# Patient Record
Sex: MALE | Race: BLACK OR AFRICAN AMERICAN | NOT HISPANIC OR LATINO | URBAN - METROPOLITAN AREA
[De-identification: names, ages, dates, MRNs, and addresses within clinical notes are randomized per-mention and may not be internally consistent; named-entity substitution may affect disease eponyms.]

---

## 2017-06-01 ENCOUNTER — EMERGENCY (EMERGENCY)
Facility: HOSPITAL | Age: 32
LOS: 1 days | Discharge: HOME | End: 2017-06-01
Attending: EMERGENCY MEDICINE
Payer: COMMERCIAL

## 2017-06-01 VITALS
OXYGEN SATURATION: 100 % | HEIGHT: 70 IN | WEIGHT: 179.9 LBS | HEART RATE: 88 BPM | TEMPERATURE: 98 F | SYSTOLIC BLOOD PRESSURE: 131 MMHG | DIASTOLIC BLOOD PRESSURE: 86 MMHG | RESPIRATION RATE: 16 BRPM

## 2017-06-01 PROCEDURE — 99283 EMERGENCY DEPT VISIT LOW MDM: CPT

## 2017-06-01 RX ORDER — DIAZEPAM 5 MG
5 TABLET ORAL ONCE
Qty: 0 | Refills: 0 | Status: DISCONTINUED | OUTPATIENT
Start: 2017-06-01 | End: 2017-06-01

## 2017-06-01 RX ORDER — IBUPROFEN 200 MG
600 TABLET ORAL ONCE
Qty: 0 | Refills: 0 | Status: COMPLETED | OUTPATIENT
Start: 2017-06-01 | End: 2017-06-01

## 2017-06-01 RX ORDER — IBUPROFEN 200 MG
1 TABLET ORAL
Qty: 12 | Refills: 0 | OUTPATIENT
Start: 2017-06-01 | End: 2017-06-04

## 2017-06-01 RX ORDER — DIAZEPAM 5 MG
1 TABLET ORAL
Qty: 9 | Refills: 0 | OUTPATIENT
Start: 2017-06-01 | End: 2017-06-04

## 2017-06-01 RX ADMIN — Medication 600 MILLIGRAM(S): at 10:18

## 2017-06-01 RX ADMIN — Medication 600 MILLIGRAM(S): at 10:48

## 2017-06-01 RX ADMIN — Medication 5 MILLIGRAM(S): at 10:18

## 2017-06-01 NOTE — ED PROVIDER NOTE - MEDICAL DECISION MAKING DETAILS
low back pain, likely musculoskeletal, low suspicion for fracture; PLAN--pain/spasm control, re-eval

## 2017-06-01 NOTE — ED ADULT NURSE NOTE - CAS EDN DISCHARGE ASSESSMENT
Symptoms improved/Alert and oriented to person, place and time/No adverse reaction to first time med in ED/Awake

## 2017-06-01 NOTE — ED ADULT NURSE NOTE - OBJECTIVE STATEMENT
Pt c/o lower back pain since Tuesday, after lifting something at work  States this morning woke up with a lot of pain

## 2017-06-01 NOTE — ED PROVIDER NOTE - CARE PLAN
Principal Discharge DX:	Acute bilateral low back pain with bilateral sciatica Principal Discharge DX:	Acute bilateral low back pain with bilateral sciatica  Instructions for follow-up, activity and diet:	1. TAKE ALL MEDICATIONS AS DIRECTED.    2. FOR PAIN OR FEVER YOU CAN TAKE IBUPROFEN (MOTRIN, ADVIL) OR ACETAMINOPHEN (TYLENOL) AS NEEDED, AS DIRECTED ON PACKAGING.  3. FOLLOW UP WITH YOUR PRIMARY DOCTOR WITHIN 5 DAYS AS DIRECTED.  4. IF YOU HAD LABS OR IMAGING DONE, YOU WERE GIVEN COPIES OF ALL LABS AND/OR IMAGING RESULTS FROM YOUR ER VISIT--PLEASE TAKE THEM WITH YOU TO YOUR FOLLOW UP APPOINTMENTS.  5. IF NEEDED, CALL PATIENT ACCESS SERVICES AT [phone] TO FIND A PRIMARY CARE PHYSICIAN.  OR CALL [phone] TO MAKE AN APPOINTMENT WITH THE CLINIC.  6. RETURN TO THE ER FOR ANY WORSENING SYMPTOMS OR CONCERNS.

## 2017-06-01 NOTE — ED PROVIDER NOTE - PHYSICAL EXAMINATION
low back: bilateral paraspinal spasm, no midline TTP  strength 5/5 in all 4 extremities  gait intact

## 2017-06-01 NOTE — ED PROVIDER NOTE - OBJECTIVE STATEMENT
30 yo male with no sig PMH in ED with low back pain s/p lifting sheet metal at work.  Pt notes that after lifting sheet metal he began to feel pain to middle of low back, radiating into both legs to level of knees.  No bowel/bladder incontinence or retention, no weakness.  No CP/SOB, N/V/D or abdominal pain.  Took no pain medication today.

## 2017-06-01 NOTE — ED PROVIDER NOTE - PLAN OF CARE
1. TAKE ALL MEDICATIONS AS DIRECTED.    2. FOR PAIN OR FEVER YOU CAN TAKE IBUPROFEN (MOTRIN, ADVIL) OR ACETAMINOPHEN (TYLENOL) AS NEEDED, AS DIRECTED ON PACKAGING.  3. FOLLOW UP WITH YOUR PRIMARY DOCTOR WITHIN 5 DAYS AS DIRECTED.  4. IF YOU HAD LABS OR IMAGING DONE, YOU WERE GIVEN COPIES OF ALL LABS AND/OR IMAGING RESULTS FROM YOUR ER VISIT--PLEASE TAKE THEM WITH YOU TO YOUR FOLLOW UP APPOINTMENTS.  5. IF NEEDED, CALL PATIENT ACCESS SERVICES AT [phone] TO FIND A PRIMARY CARE PHYSICIAN.  OR CALL [phone] TO MAKE AN APPOINTMENT WITH THE CLINIC.  6. RETURN TO THE ER FOR ANY WORSENING SYMPTOMS OR CONCERNS.

## 2017-06-05 DIAGNOSIS — M54.41 LUMBAGO WITH SCIATICA, RIGHT SIDE: ICD-10-CM

## 2017-06-05 DIAGNOSIS — M54.42 LUMBAGO WITH SCIATICA, LEFT SIDE: ICD-10-CM

## 2020-03-19 ENCOUNTER — EMERGENCY (EMERGENCY)
Facility: HOSPITAL | Age: 35
LOS: 1 days | Discharge: HOME | End: 2020-03-19
Attending: EMERGENCY MEDICINE
Payer: COMMERCIAL

## 2020-03-19 VITALS
TEMPERATURE: 99 F | SYSTOLIC BLOOD PRESSURE: 150 MMHG | HEART RATE: 80 BPM | HEIGHT: 73 IN | WEIGHT: 169.98 LBS | DIASTOLIC BLOOD PRESSURE: 99 MMHG | RESPIRATION RATE: 18 BRPM | OXYGEN SATURATION: 100 %

## 2020-03-19 PROCEDURE — U0001: CPT

## 2020-03-19 PROCEDURE — 99283 EMERGENCY DEPT VISIT LOW MDM: CPT

## 2020-03-19 PROCEDURE — 99282 EMERGENCY DEPT VISIT SF MDM: CPT

## 2020-03-19 NOTE — ED PROVIDER NOTE - NSFOLLOWUPINSTRUCTIONS_ED_ALL_ED_FT
You may have Coronavirus    COVID-19 testing are currently being prioritized at [facility] for admitted patients.     Patients that are stable for discharged from the ED will have COVID-19 testing performed within 7-10 days at which time most symptoms should improve.  For that reason, although, there is suspcion that you may have Coronavirus, you were not tested today. Please follow instruction on provided COVID-19 discharge educational forms and self quarantine for 14 days.     In addition, you have been placed on our surveillance tracker. Return to the ED immediately if you have shortness of breath, fever, pain, weakness, vomiting any concerns.    1. STAY HOME for 14 DAYS  2. Minimize Human contact to ONLY ESSENTIAL  3. Every time you wash your hands, sing the HAPPY BIRTHDAY Song so you know you're washing long enough.  Make sure to scrub the webspace between your fingers.  4. DRINK 2-3 Liters of Pedialyte or Sugarfree Gatorade per day x at least 5 days.  The more you drink, the more energy you will have.  Your fatigue, lightheadedness, and body aches will decrease and your fever has a better chance of breaking if you are well hydrated.    5. For your Fever and Body aches takes TYLENOL 650 mg every 6 hours or Aleeve 440 every 12 hours  6. Use an inhaler for mild shortness of breath and cough.  AVOID MOTRIN ADVIL IBUPROFEN ALEEVE NAPROXEN  7. RETURN TO THE ER IMMEDIATELY IF YOU HAVE WORSENING SHORTNESS OF BREATH    Consider Using [employer] To get Pedialyte AND CAPSULE FOR Medicine to be delivered to your home.

## 2020-03-19 NOTE — ED PROVIDER NOTE - OBJECTIVE STATEMENT
33 yo male with COVID + patietn contact at work presentd with cough, low grade fever, without SOB, mylagias or lightheadedness.  PT denies medical problems,  elderly roommates.  HE does smoke

## 2020-03-19 NOTE — ED PROVIDER NOTE - PATIENT PORTAL LINK FT
You can access the FollowMyHealth Patient Portal offered by [facility] by registering at the following website: [email or web address]. By joining [employer]’s FollowMyHealth portal, you will also be able to view your health information using other applications (apps) compatible with our system.

## 2020-03-21 LAB — SARS-COV-2 RNA SPEC QL NAA+PROBE: (no result)

## 2020-04-10 ENCOUNTER — EMERGENCY (EMERGENCY)
Facility: HOSPITAL | Age: 35
LOS: 1 days | Discharge: HOME | End: 2020-04-10
Payer: COMMERCIAL

## 2020-04-10 VITALS
OXYGEN SATURATION: 100 % | SYSTOLIC BLOOD PRESSURE: 145 MMHG | RESPIRATION RATE: 20 BRPM | HEART RATE: 103 BPM | TEMPERATURE: 98 F | WEIGHT: 160.06 LBS | HEIGHT: 71 IN | DIASTOLIC BLOOD PRESSURE: 97 MMHG

## 2020-04-10 PROCEDURE — 99283 EMERGENCY DEPT VISIT LOW MDM: CPT

## 2020-04-10 NOTE — ED PROVIDER NOTE - OBJECTIVE STATEMENT
35 yo male with no PMHx and recent COVID + results. Patient is symptom free and seeking return to work letter. Currently no complaints.

## 2020-04-10 NOTE — ED PROVIDER NOTE - PROGRESS NOTE DETAILS
Pt is well appearing walking with steady gait, stable for discharge and follow up without fail with medical doctor. I had a detailed discussion with the patient and/or guardian regarding the historical points, exam findings, and any diagnostic results supporting the discharge diagnosis. Pt educated on care and need for follow up. Strict return instructions and red flag signs and symptoms discussed with patient. Questions answered. Pt shows understanding of discharge information and agrees to follow..

## 2020-04-10 NOTE — ED PROVIDER NOTE - PATIENT PORTAL LINK FT
You can access the FollowMyHealth Patient Portal offered by [facility] by registering at the following website: [email or web address]. By joining [employer]’s FollowMyHealth portal, you will also be able to view your health information using other applications (apps) compatible with our system.

## 2021-04-19 NOTE — ED ADULT NURSE NOTE - GASTROINTESTINAL ASSESSMENT
4 Eyes Skin Assessment    Luis Fernando Comment is being assessed upon: Admission    I agree that Jose Francisco Espinoza, along with Magda Dent, AMARJIT (WDL

## 2022-09-27 ENCOUNTER — EMERGENCY (EMERGENCY)
Facility: HOSPITAL | Age: 37
LOS: 1 days | Discharge: HOME | End: 2022-09-27
Attending: STUDENT IN AN ORGANIZED HEALTH CARE EDUCATION/TRAINING PROGRAM
Payer: COMMERCIAL

## 2022-09-27 VITALS
RESPIRATION RATE: 16 BRPM | SYSTOLIC BLOOD PRESSURE: 189 MMHG | DIASTOLIC BLOOD PRESSURE: 103 MMHG | HEIGHT: 71 IN | WEIGHT: 199.96 LBS | HEART RATE: 88 BPM | TEMPERATURE: 99 F | OXYGEN SATURATION: 98 %

## 2022-09-27 VITALS — DIASTOLIC BLOOD PRESSURE: 89 MMHG | SYSTOLIC BLOOD PRESSURE: 162 MMHG

## 2022-09-27 PROCEDURE — 99283 EMERGENCY DEPT VISIT LOW MDM: CPT

## 2022-09-27 PROCEDURE — 99284 EMERGENCY DEPT VISIT MOD MDM: CPT

## 2022-09-27 PROCEDURE — 96372 THER/PROPH/DIAG INJ SC/IM: CPT

## 2022-09-27 RX ORDER — KETOROLAC TROMETHAMINE 30 MG/ML
30 SYRINGE (ML) INJECTION ONCE
Refills: 0 | Status: DISCONTINUED | OUTPATIENT
Start: 2022-09-27 | End: 2022-09-27

## 2022-09-27 RX ORDER — METHOCARBAMOL 500 MG/1
2 TABLET, FILM COATED ORAL
Qty: 20 | Refills: 0
Start: 2022-09-27 | End: 2022-10-01

## 2022-09-27 RX ORDER — LIDOCAINE 4 G/100G
1 CREAM TOPICAL ONCE
Refills: 0 | Status: COMPLETED | OUTPATIENT
Start: 2022-09-27 | End: 2022-09-27

## 2022-09-27 RX ORDER — IBUPROFEN 200 MG
1 TABLET ORAL
Qty: 21 | Refills: 0
Start: 2022-09-27 | End: 2022-10-03

## 2022-09-27 RX ORDER — DIAZEPAM 5 MG
5 TABLET ORAL ONCE
Refills: 0 | Status: DISCONTINUED | OUTPATIENT
Start: 2022-09-27 | End: 2022-09-27

## 2022-09-27 RX ORDER — METHOCARBAMOL 500 MG/1
750 TABLET, FILM COATED ORAL ONCE
Refills: 0 | Status: COMPLETED | OUTPATIENT
Start: 2022-09-27 | End: 2022-09-27

## 2022-09-27 RX ADMIN — Medication 5 MILLIGRAM(S): at 13:16

## 2022-09-27 RX ADMIN — METHOCARBAMOL 750 MILLIGRAM(S): 500 TABLET, FILM COATED ORAL at 13:16

## 2022-09-27 RX ADMIN — LIDOCAINE 1 PATCH: 4 CREAM TOPICAL at 13:16

## 2022-09-27 RX ADMIN — Medication 30 MILLIGRAM(S): at 13:15

## 2022-09-27 NOTE — ED ADULT TRIAGE NOTE - CHIEF COMPLAINT QUOTE
Back pain since Sunday, started while brushing teeth.  Sts pain radiating down R leg. No urinary/bowel incontinence

## 2022-09-27 NOTE — ED STATDOCS - PATIENT PORTAL LINK FT
You can access the FollowMyHealth Patient Portal offered by [facility] by registering at the following website: [email or web address]. By joining [employer]’s FollowMyHealth portal, you will also be able to view your health information using other applications (apps) compatible with our system.

## 2022-09-27 NOTE — ED STATDOCS - ADDITIONAL NOTES AND INSTRUCTIONS:
Patient given valium (benzodiazepine) while in emergency department which may reflect positively on urine drug screen

## 2022-09-27 NOTE — ED STATDOCS - OBJECTIVE STATEMENT
35 y/o male with no PMHx here with 3 days of back pain. Reports threw out his back 3 days ago and had pain since. Went to chiropractor yesterday which helped for 6 hours, and then pain returned. Pain is primarily in lower back, bilateral. Worse with standing compared to sitting. Sometimes radiates down right leg. Not taking any oral medication. Denies any traumatic injury. Denies any fever, N/V, CP, abdominal pain, dysuria, numbness, tingling, weakness, urinary or bowel incontinence.

## 2022-09-27 NOTE — ED STATDOCS - PHYSICAL EXAMINATION
Vital Signs per nursing documentation  Gen: well appearing, no acute distress  HEENT: NCAT, MMM  Cardiac: regular rate rhythm, normal S1S2  Chest: clear to auscultation bilateral, no wheezes or crackles  Abdomen: soft, non tender non distended  Extremity: no gross deformity, good perfusion  Skin: no rash  Neuro: nonfocal neuro exam, gait steady   MSK: mild paraspinal tenderness bilateral.

## 2022-12-12 ENCOUNTER — EMERGENCY (EMERGENCY)
Facility: HOSPITAL | Age: 37
LOS: 1 days | Discharge: HOME | End: 2022-12-12
Attending: STUDENT IN AN ORGANIZED HEALTH CARE EDUCATION/TRAINING PROGRAM
Payer: COMMERCIAL

## 2022-12-12 VITALS
DIASTOLIC BLOOD PRESSURE: 97 MMHG | OXYGEN SATURATION: 98 % | TEMPERATURE: 99 F | SYSTOLIC BLOOD PRESSURE: 159 MMHG | WEIGHT: 206.35 LBS | RESPIRATION RATE: 18 BRPM | HEART RATE: 89 BPM

## 2022-12-12 LAB
FLUAV AG NPH QL: DETECTED
FLUBV AG NPH QL: SIGNIFICANT CHANGE UP
RSV RNA NPH QL NAA+NON-PROBE: SIGNIFICANT CHANGE UP
SARS-COV-2 RNA SPEC QL NAA+PROBE: SIGNIFICANT CHANGE UP

## 2022-12-12 PROCEDURE — 99283 EMERGENCY DEPT VISIT LOW MDM: CPT

## 2022-12-12 PROCEDURE — 87637 SARSCOV2&INF A&B&RSV AMP PRB: CPT

## 2022-12-12 RX ORDER — ACETAMINOPHEN 500 MG
650 TABLET ORAL ONCE
Refills: 0 | Status: COMPLETED | OUTPATIENT
Start: 2022-12-12 | End: 2022-12-12

## 2022-12-12 RX ADMIN — Medication 650 MILLIGRAM(S): at 05:26

## 2022-12-12 NOTE — ED ADULT TRIAGE NOTE - CHIEF COMPLAINT QUOTE
C/O fever, chills and cough since Saturday. Took Advil about 2 hours ago. Took negative at home COVID test. Denies recent sick contacts.

## 2022-12-12 NOTE — ED PROVIDER NOTE - PATIENT PORTAL LINK FT
You can access the FollowMyHealth Patient Portal offered by [facility] by registering at the following website: [email or web address]. By joining [employer]’s FollowMyHealth portal, you will also be able to view your health information using other applications (apps) compatible with our system.

## 2022-12-12 NOTE — ED ADULT NURSE NOTE - CHIEF COMPLAINT QUOTE
Hypertensive Disorder
C/O fever, chills and cough since Saturday. Took Advil about 2 hours ago. Took negative at home COVID test. Denies recent sick contacts.

## 2022-12-12 NOTE — ED ADULT NURSE NOTE - OBJECTIVE STATEMENT
Assumed care of pt at 0530 in . Pt A&Ox4 c/o fever/chills/cough since Saturday, the pt states that he took Advil about 2 hours ago, the pt states that he was negative on a home COVID test, the pt denies any recent sick contacts/N/V/D/CP/SOB, pt resting comfortably showing no signs of respiratory distress or pain, the pt is calm and cooperative

## 2022-12-12 NOTE — ED PROVIDER NOTE - OBJECTIVE STATEMENT
pt is a 38 y/o male presenting to the the ed for evaluation. pt with fever cough congestion x 2 days. pt states took advil around 2 am. pt denies cp sob palpitations abd pain nausea vomiting diarrhea back pain dysuria numbness or loss of sensation

## 2025-02-11 NOTE — ED ADULT NURSE NOTE - CAS EDN INTEG ASSESS
Spoke to pt regarding incomplete labs and/or test that were required for next visit(lipid panel, cmp, bnp and echo).  Pt has been scheduled for echo test and rescheduled to see Dr. Cruz.  Pt verbalized understanding for the need for his labs and the test to be done before coming back to follow up with Dr. Cruz  
WDL